# Patient Record
Sex: MALE | Race: WHITE | Employment: OTHER | URBAN - METROPOLITAN AREA
[De-identification: names, ages, dates, MRNs, and addresses within clinical notes are randomized per-mention and may not be internally consistent; named-entity substitution may affect disease eponyms.]

---

## 2024-05-01 ENCOUNTER — OFFICE VISIT (OUTPATIENT)
Dept: URGENT CARE | Age: 24
End: 2024-05-01
Payer: COMMERCIAL

## 2024-05-01 VITALS
SYSTOLIC BLOOD PRESSURE: 120 MMHG | RESPIRATION RATE: 18 BRPM | HEART RATE: 89 BPM | DIASTOLIC BLOOD PRESSURE: 75 MMHG | OXYGEN SATURATION: 98 % | TEMPERATURE: 99.4 F

## 2024-05-01 DIAGNOSIS — H92.01 RIGHT EAR PAIN: ICD-10-CM

## 2024-05-01 DIAGNOSIS — K08.89 PAIN, DENTAL: Primary | ICD-10-CM

## 2024-05-01 PROCEDURE — G0383 LEV 4 HOSP TYPE B ED VISIT: HCPCS

## 2024-05-01 RX ORDER — FLUTICASONE PROPIONATE 50 MCG
1 SPRAY, SUSPENSION (ML) NASAL DAILY
Qty: 11.1 ML | Refills: 0 | Status: SHIPPED | OUTPATIENT
Start: 2024-05-01

## 2024-05-01 RX ORDER — AMOXICILLIN 500 MG/1
500 CAPSULE ORAL EVERY 8 HOURS SCHEDULED
Qty: 21 CAPSULE | Refills: 0 | Status: SHIPPED | OUTPATIENT
Start: 2024-05-01 | End: 2024-05-08

## 2024-05-02 NOTE — PROGRESS NOTES
[facility]        NAME: Hilton Spaulding is a 23 y.o. male  : 2000    MRN: [id number]  DATE: May 1, 2024  TIME: 8:38 PM    Assessment and Plan   Pain, dental [K08.89]  1. Pain, dental  amoxicillin (AMOXIL) 500 mg capsule      2. Right ear pain  fluticasone (FLONASE) 50 mcg/act nasal spray      Please begin antibiotics as directed   Alternate Tylenol Ibuprofen as needed for pain.   Follow up with dentist tomorrow as planned.       Patient Instructions   Earache   DISCHARGE INSTRUCTIONS:   Return to the emergency department if:   You have a severe earache.     You have ear pain with itching, hearing loss, dizziness, a feeling of fullness in your ear, or ringing in your ears.     Call your doctor if:   Your ear pain worsens or does not go away with treatment.     You have drainage from your ear.     You have a fever.     Your outer ear becomes red, swollen, and warm.     You have questions or concerns about your condition or care.     Medicines:  You may need any of the following:  Acetaminophen  decreases pain and fever. It is available without a doctor's order. Ask how much to take and how often to take it. Follow directions. Read the labels of all other medicines you are using to see if they also contain acetaminophen, or ask your doctor or pharmacist. Acetaminophen can cause liver damage if not taken correctly.     NSAIDs , such as ibuprofen, help decrease swelling, pain, and fever. This medicine is available with or without a doctor's order. NSAIDs can cause stomach bleeding or kidney problems in certain people. If you take blood thinner medicine, always ask your healthcare provider if NSAIDs are safe for you. Always read the medicine label and follow directions.     Do not give aspirin to children younger than 18 years.  Your child could develop Reye syndrome if he or she has the flu or a fever and takes aspirin. Reye syndrome can cause life-threatening brain and liver damage. Check your  child's medicine labels for aspirin or salicylates.     Take your medicine as directed.  Contact your healthcare provider if you think your medicine is not helping or if you have side effects. Tell your provider if you are allergic to any medicine. Keep a list of the medicines, vitamins, and herbs you take. Include the amounts, and when and why you take them. Bring the list or the pill bottles to follow-up visits. Carry your medicine list with you in case of an emergency.     Follow up with your doctor as directed:  Write down your questions so you remember to ask them during your visits.  © Copyright [employer] 2023 Information is for End User's use only and may not be sold, redistributed or otherwise used for commercial purposes.  The above information is an  only. It is not intended as medical advice for individual conditions or treatments. Talk to your doctor, nurse or pharmacist before following any medical regimen to see if it is safe and effective for you.       Follow up with PCP in 3-5 days.  Proceed to  ER if symptoms worsen.    Chief Complaint     Chief Complaint   Patient presents with    Earache    Oral Pain     Patient states that for about 10 days now he has had oral pain on the top left in the back of his mouth. He notes that for 3 days now his left ear is causing him pain as well. He notes left sided facial pain. He has a dentist appt tomorrow but is concerned for infections.          History of Present Illness       23 year old male with PMH significant for root canal of left upper molar about 3 years ago, presents for evaluation of left upper molar pain over the past 10 days, and now progressively worsening ear pain over the last 1-2 days. He denies trauma/injury, facial swelling, fever, tinnitus, decreased hearing.     Oral Pain   This is a new problem. The current episode started 1 to 4 weeks ago (x 10 days). The problem occurs hourly. The problem has been gradually worsening. The  pain is moderate. Pertinent negatives include no difficulty swallowing, facial pain, fever, oral bleeding, sinus pressure or thermal sensitivity. Associated symptoms comments: Left ear pain. He has tried NSAIDs for the symptoms. The treatment provided no relief.       Review of Systems   Review of Systems   Constitutional:  Negative for fatigue and fever.   HENT:  Positive for dental problem and ear pain. Negative for congestion, drooling, ear discharge, facial swelling, hearing loss, mouth sores, nosebleeds, postnasal drip, rhinorrhea, sinus pressure, sinus pain, sneezing and sore throat.    Eyes: Negative.  Negative for pain, discharge, redness and itching.   Respiratory: Negative.  Negative for apnea, cough, choking, chest tightness, shortness of breath, wheezing and stridor.    Cardiovascular: Negative.  Negative for chest pain and palpitations.   Gastrointestinal: Negative.  Negative for diarrhea, nausea and vomiting.   Endocrine: Negative.  Negative for polydipsia, polyphagia and polyuria.   Genitourinary: Negative.  Negative for decreased urine volume and flank pain.   Musculoskeletal: Negative.  Negative for arthralgias, back pain, myalgias, neck pain and neck stiffness.   Skin: Negative.  Negative for color change and rash.   Allergic/Immunologic: Negative.  Negative for environmental allergies.   Neurological: Negative.  Negative for dizziness, facial asymmetry, light-headedness, numbness and headaches.   Hematological: Negative.  Negative for adenopathy.   Psychiatric/Behavioral: Negative.           Current Medications       Current Outpatient Medications:     amoxicillin (AMOXIL) 500 mg capsule, Take 1 capsule (500 mg total) by mouth every 8 (eight) hours for 7 days, Disp: 21 capsule, Rfl: 0    fluticasone (FLONASE) 50 mcg/act nasal spray, 1 spray into each nostril daily, Disp: 11.1 mL, Rfl: 0    Current Allergies     Allergies as of 05/01/2024    (No Known Allergies)            The following portions of  the patient's history were reviewed and updated as appropriate: allergies, current medications, past family history, past medical history, past social history, past surgical history and problem list.     No past medical history on file.    No past surgical history on file.    No family history on file.      Medications have been verified.        Objective   /75   Pulse 89   Temp 99.4 °F (37.4 °C)   Resp 18   SpO2 98%        Physical Exam     Physical Exam  Vitals reviewed.   Constitutional:       General: He is not in acute distress.     Appearance: Normal appearance. He is not ill-appearing, toxic-appearing or diaphoretic.      Interventions: He is not intubated.  HENT:      Head: Normocephalic and atraumatic.      Jaw: There is normal jaw occlusion. No trismus, tenderness, swelling, pain on movement or malocclusion.      Right Ear: Tympanic membrane, ear canal and external ear normal. There is no impacted cerumen.      Left Ear: Tympanic membrane, ear canal and external ear normal. There is no impacted cerumen.      Nose: Nose normal. No congestion or rhinorrhea.      Mouth/Throat:      Mouth: Mucous membranes are moist.      Dentition: Does not have dentures. Dental tenderness present. No gingival swelling, dental caries or dental abscesses.      Tongue: No lesions. Tongue does not deviate from midline.      Palate: No mass and lesions.      Pharynx: Oropharynx is clear. Uvula midline. No pharyngeal swelling, oropharyngeal exudate, posterior oropharyngeal erythema or uvula swelling.      Tonsils: No tonsillar exudate or tonsillar abscesses. 1+ on the right. 1+ on the left.     Eyes:      Extraocular Movements: Extraocular movements intact.      Conjunctiva/sclera: Conjunctivae normal.      Pupils: Pupils are equal, round, and reactive to light.   Neck:      Thyroid: No thyroid mass, thyromegaly or thyroid tenderness.   Cardiovascular:      Rate and Rhythm: Normal rate and regular rhythm.      Pulses:  Normal pulses.      Heart sounds: Normal heart sounds, S1 normal and S2 normal. Heart sounds not distant. No murmur heard.     No friction rub. No gallop.   Pulmonary:      Effort: Pulmonary effort is normal. No tachypnea, bradypnea, accessory muscle usage, prolonged expiration, respiratory distress or retractions. He is not intubated.      Breath sounds: Normal breath sounds. No stridor, decreased air movement or transmitted upper airway sounds. No decreased breath sounds, wheezing, rhonchi or rales.   Chest:      Chest wall: No tenderness.   Musculoskeletal:      Cervical back: Full passive range of motion without pain, normal range of motion and neck supple. No rigidity or tenderness. No spinous process tenderness or muscular tenderness. Normal range of motion.   Lymphadenopathy:      Cervical: No cervical adenopathy.      Right cervical: No superficial cervical adenopathy.     Left cervical: No superficial cervical adenopathy.   Skin:     General: Skin is warm and dry.      Capillary Refill: Capillary refill takes less than 2 seconds.      Findings: No erythema.   Neurological:      General: No focal deficit present.      Mental Status: He is alert.   Psychiatric:         Mood and Affect: Mood normal.

## 2024-05-02 NOTE — PATIENT INSTRUCTIONS
Please begin antibiotics as directed   Alternate Tylenol Ibuprofen as needed for pain.   Follow up with dentist tomorrow as planned.